# Patient Record
Sex: FEMALE | Race: WHITE | Employment: FULL TIME | URBAN - METROPOLITAN AREA
[De-identification: names, ages, dates, MRNs, and addresses within clinical notes are randomized per-mention and may not be internally consistent; named-entity substitution may affect disease eponyms.]

---

## 2020-07-20 NOTE — PROGRESS NOTES
Attempted all 3 of PT's listed numbers to schedule COVID testing without success. PT's home number is not to be used as she no longer lives there. Will attempt to call her work and cellphone numbers later.  MB

## 2020-07-21 NOTE — PROGRESS NOTES
Radiation Oncology COVID-19 Patient Pre Screen  HAVE YOU HAD ANY OF THESE SYMPTOMS IN THE LAST 14 DAYS? Cough   Fever   Shortness of Breath  NO  HAVE YOU TRAVELED OUTSIDE OF [place] IN THE LAST 14 DAYS? NO    HAVE YOU BEEN EXPOSED TO ANYONE WITH COVID 19 IN THE LAST 14 DAYS? NO      Patient notified of scheduling and testing process for COVID 19 testing. Patient verbalized understanding that they are to get their testing done 72 hours prior to their [place] [address] appointment. PT was unable to get tested until 7/22 per her request.  PT aware that unavailability of test results can cause treatment delays.   MB

## 2020-07-22 ENCOUNTER — HOSPITAL ENCOUNTER (OUTPATIENT)
Age: 60
Setting detail: SPECIMEN
Discharge: HOME | End: 2020-07-22
Payer: COMMERCIAL

## 2020-07-22 ENCOUNTER — NURSE ONLY (OUTPATIENT)
Dept: PRIMARY CARE CLINIC | Age: 60
End: 2020-07-22

## 2020-07-22 PROCEDURE — U0003 INFECTIOUS AGENT DETECTION BY NUCLEIC ACID (DNA OR RNA); SEVERE ACUTE RESPIRATORY SYNDROME CORONAVIRUS 2 (SARS-COV-2) (CORONAVIRUS DISEASE [COVID-19]), AMPLIFIED PROBE TECHNIQUE, MAKING USE OF HIGH THROUGHPUT TECHNOLOGIES AS DESCRIBED BY CMS-2020-01-R: HCPCS

## 2020-07-24 ENCOUNTER — HOSPITAL ENCOUNTER (OUTPATIENT)
Dept: RADIATION ONCOLOGY | Age: 60
Discharge: HOME | End: 2020-07-24
Attending: RADIOLOGY
Payer: COMMERCIAL

## 2020-07-24 ENCOUNTER — HOSPITAL ENCOUNTER (OUTPATIENT)
Dept: RADIATION ONCOLOGY | Age: 60
Discharge: HOME | End: 2020-07-24
Payer: COMMERCIAL

## 2020-07-24 VITALS
SYSTOLIC BLOOD PRESSURE: 178 MMHG | OXYGEN SATURATION: 98 % | HEIGHT: 62 IN | BODY MASS INDEX: 20.06 KG/M2 | HEART RATE: 81 BPM | RESPIRATION RATE: 16 BRPM | WEIGHT: 109 LBS | TEMPERATURE: 97.9 F | DIASTOLIC BLOOD PRESSURE: 96 MMHG

## 2020-07-24 LAB
SARS-COV-2: NOT DETECTED
SOURCE: NORMAL

## 2020-07-24 PROCEDURE — 77334 RADIATION TREATMENT AID(S): CPT | Performed by: RADIOLOGY

## 2020-07-24 PROCEDURE — 77290 THER RAD SIMULAJ FIELD CPLX: CPT | Performed by: RADIOLOGY

## 2020-07-24 PROCEDURE — 99212 OFFICE O/P EST SF 10 MIN: CPT | Performed by: RADIOLOGY

## 2020-07-24 NOTE — H&P
breast at 5 o'clock     middle depth is suspicious of malignancy.  A surgical consult is     recommended.  If breast conservation is desired, then MRI biopsy is     recommended.    The 1 cm x 1 cm x 1.2 cm irregular mass in the left breast at 1 o'clock     middle depth is a known biopsy positive for malignancy.     She declined additional breast biopsy and communicated that she was amenable to mastectomy,. Dr Bonnie Nieto initiated [address] 1/20 which was completed 5/20. She underwent mastectomy on 6/3/20 without reconstruction.  Final pathology showed:      LEFT BREAST:        BREAST INVASIVE CARCINOMA WORKSHEET         Part: A-E   Procedure:         Total mastectomy (including nipple-sparing and skin-sparing   mastectomy)   Specimen Laterality:         Left   Tumor size: Size of largest invasive carcinoma:         Microinvasion only (<=1 mm)   Tumor Focality:         Single focus of invasive carcinoma   Histologic Type of Invasive Carcinoma:         Invasive carcinoma of no special type (ductal, not otherwise   specified)   Histologic Grade:         Glandular (Acinar) / Tubular Differentiation:         Only microinvasion present (not graded)         Nuclear Pleomorphism:         Only microinvasion present (not graded)         Mitotic Rate:         Only microinvasion present (not graded)         Overall Grade:         Only microinvasion present (not graded)   Ductal Carcinoma In Situ:         Not identified   Tumor Extension:   Skin:         Skin is present and uninvolved   Nipple:         Uninvolved   Skeletal muscle:         No skeletal muscle is present   Invasive Carcinoma Margins:         Margins uninvolved by invasive carcinoma         Distance from closest margin: 10 mm         Closest margin: Deep   DCIS Margins:         Not applicable (no DCIS in specimen)   Lymph Nodes:         Involved by tumor cells         Number of lymph nodes with macrometastases (>2 mm): 0         Number of lymph nodes with micrometastases (>0.2 mm to 2 mm and/or   >200 cells): 0         Number of lymph nodes with isolated tumor cells (<= 0.2 mm and <= 200   cells): 1         Size of largest metastatic deposit: 0.1 mm         Extranodal extension not identified         Total number of lymph nodes examined: 4         Number of sentinel nodes examined (if applicable): 4   Treatment Effect:         Probable or definite response to presurgical therapy in the invasive   carcinoma         Probable or definite response to presurgical therapy in metastatic   carcinoma   Lymph-Vascular Invasion:         Not identified   Pathologic Stage Classification (pTNM,AJCC 8th ed)   TNM Descriptor(s):         y (post- treatment)   Primary Tumor (Invasive Carcinoma) (pT):         pT1mi   Regional Lymph Nodes (pN):         Modifier:         (sn): [place] node(s) evaluated   Category (pN):         pN0 (i+)   Distant metastasis:         Distant Metastasis (pM) Not applicable/Not confirmed pathologically   in this case   Estrogen & progesterone receptors:         Previously performed and reported as follows:           Estrogen receptor: Negative (<1%)           Progesterone receptor: Negative (<1%)           Specimen number #: [id number]   (HER2) ERBB2 Status:         Previously performed and reported as follows:           HER2:Negative (1+)           Specimen number #: [id number]   Representative Tumor Block:         Specify: E2   Residual tumor burden:         Tumor bed dimension #1: 7 mm         Tumor bed dimension #2: 7 mm         Overall tumor cellularity 1%         Percentage in situ 0%       She was seen by radiation oncology at [facility] but would like to be treated closer to home in [facility]. She is here today for adjuvant radiation recommendations.     PAST MEDICAL HISTORY:   Past Medical History:   Diagnosis Date    Cancer [facility])     Environmental allergies        PAST SURGICAL HISTORY:  Past Surgical History:   Procedure Laterality Date    BREAST LUMPECTOMY Left     20 years ago    COLONOSCOPY  14    DR PIZANO    TUBAL LIGATION          ALLERGIES:   No Known Allergies    CURRENT MEDICATIONS:   Prior to Admission medications    Not on File       I have personally reviewed and reconciled the medications listed. FAMILY HISTORY:   Family History   Problem Relation Age of Onset    Colon Cancer Father 46    Liver Cancer Father     Colon Cancer Maternal Grandmother        SOCIAL HISTORY:   Social History     Tobacco Use   Smoking Status Current Every Day Smoker     Social History     Substance and Sexual Activity   Alcohol Use None       Review Of Systems:     Pain Score: denies       General: weight loss, appetite loss  Patient has gained weight []? Yes   [x]? No  Patient has lost weight [x]? Yes   []? No  How much weight in pounds and over what length of time: about 20 lbs over last year     Eyes (Ophthalmic): denies                Skin (Dermatological): healing masectomy incision left side                ENT: denies                Respiratory: denies                Cardiovascular: denies                            Device   []? Yes   [x]? No              Copy of Card Obtained []? Yes   [x]? No     Gastrointestinal: denies     Genito-Urinary: denies                Breast: mastectomy left side 6/3/2020                Musculoskeletal: denies     Neurological: denies                             Hematological and Lymphatic: chemotherapy adriamycin, paclitaxel. Completed 2020.  Medical oncologist Dr Tita Sheppard                 Endocrine: denies     Gyn History:   Mami Schoolin/  Age of Menarche: 17  Age of Menopause endometrial ablation 47years old, did not notice any symptoms of menopause after this procedure  Vaginal Bleeding: none  First Pregnancy: 17  Breast Feeding:  no  Last Menstrual period: N/A  Oral Contraceptives: yes     Number of years: 15  Hormone Replacement therapy none     Number of Years: N/A  Last Pap Smear:   Last Mammogram 10/2019       A 10-point review of systems has been conducted and pertinent positives have been   recorded. All other review of systems are negative. ECOG PERFORMANCE STATUS: 0    VITAL SIGNS:   Vitals:    07/24/20 1021   BP: (!) 178/96   Pulse: 81   Resp: 16   Temp: 97.9 °F (36.6 °C)   TempSrc: Temporal   SpO2: 98%   Weight: 109 lb (49.4 kg)   Height: 5' 2\" (1.575 m)       PHYSICAL EXAMINATION:  Constitutional: No acute distress. awake, alert, cooperative    HEENT:  Normocephalic, without obvious abnormality, atraumatic, EOMI, external ears without lesions, oral pharynx with moist mucus membranes, orophayrnx clear, mucous membranes moist    NECK:  Supple, without supraclavicular or cervical adenopathy, thyroid symmetric, not enlarged    CARDIOVASCULAR:  Normal apical impulse, regular rate and rhythm, normal S1 and S2, no S3 or S4, and no murmur noted    CHEST/BREASTS:  Breasts asymmetrical s/p L mastectomy with well healed incisions, skin without lesion(s), no nipple retraction or dimpling, no nipple discharge, no masses palpated, no axillary or supraclavicular adenopathy    LUNGS:  No increased work of breathing, good air exchange, clear to auscultation bilaterally, no crackles or wheezing    ABDOMEN:  Nontender, nondistended, normal bowel sounds, soft, no masses, no hepatosplenomegally    EXTREMITIES: No cyanosis clubbing or lymphedema    MUSCULOSKELETAL: No bony tenderness along the hips ribs or spine. Motor strength is 5 out of 5 all extremities bilaterally. Tone is normal.    NEUROLOGIC:  Awake, alert, oriented x 3. Nonfocal    SKIN:  Tanned, no bruising or bleeding, normal skin color, texture, turgor, no redness, warmth, or swelling, no rashes, no lesions, no abnormal moles and no jaundice      STUDIES: I have personally reviewed the imaging, laboratory, and pathology studies as previously described.     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 Adjuvant radiation to the L CW and regional nodes 50GY 25 fx with consideration of boost to tumor bed for high grade. Active breath control evaluation for cardiac sparing. Skin care and avoidance of tanning as well as potential side effects discussed. Also discussed [facility] CH[facility] trial for hypofractionated vs standard radiotherapy following mastectomy, however she is not a candidate as she does not intend on reconstruction which is a ceriteria for enrollment. Smoking cessation    Intent of treatment, potential risks benefits and side effects reviewed today. Thank you for this consult and allowing us to participate in the care of this patient. Sincerely,    Electronically signed by Regis Cortez MD on 7/24/20 at 11:49 AM EDT      cc:   No att. providers found  Jose Franco MD  No referring provider defined for this encounter.

## 2020-07-24 NOTE — FLOWSHEET NOTE
NURSING ASSESSMENT     Date: 7/24/2020        Patient Name: Marlin Harrison     YOB: 1960      Age:  64 y.o. MRN: [id number]     Chaperone [] Yes   [] No      Advance Directives:   Do you currently have completed advance directives (living will)? [x] Yes   [] No         if yes, please bring us a copy for your records. Pain Score: denies      General: weight loss, appetite loss  Patient has gained weight [] Yes   [x] No  Patient has lost weight [x] Yes   [] No  How much weight in pounds and over what length of time: about 20 lbs over last year    Eyes (Ophthalmic): denies     Skin (Dermatological): healing masectomy incision left side     ENT: denies     Respiratory: denies     Cardiovascular: denies      Device   [] Yes   [x] No   Copy of Card Obtained [] Yes   [x] No    Gastrointestinal: denies    Genito-Urinary: denies     Breast: mastectomy left side 6/3/2020     Musculoskeletal: denies    Neurological: denies       Hematological and Lymphatic: chemotherapy adriamycin, paclitaxel. Completed 5/1/2020.  Medical oncologist Dr Emperatriz Carlson      Endocrine: denies    Gyn History:   Fausto Asher: 2/2  Age of Menarche: 17  Age of Menopause endometrial ablation 47years old, did not notice any symptoms of menopause after this procedure  Vaginal Bleeding: none  First Pregnancy: 17  Breast Feeding:  no  Last Menstrual period: N/A  Oral Contraceptives: yes     Number of years: 15  Hormone Replacement therapy none     Number of Years: N/A  Last Pap Smear: 2015  Last Mammogram 10/2019

## 2020-07-29 PROCEDURE — 77295 3-D RADIOTHERAPY PLAN: CPT | Performed by: RADIOLOGY

## 2020-07-29 PROCEDURE — 77334 RADIATION TREATMENT AID(S): CPT | Performed by: RADIOLOGY

## 2020-07-29 PROCEDURE — 77300 RADIATION THERAPY DOSE PLAN: CPT | Performed by: RADIOLOGY

## 2020-07-30 PROCEDURE — 77470 SPECIAL RADIATION TREATMENT: CPT | Performed by: RADIOLOGY

## 2020-07-31 ENCOUNTER — HOSPITAL ENCOUNTER (OUTPATIENT)
Dept: RADIATION ONCOLOGY | Age: 60
Discharge: HOME | End: 2020-07-31
Attending: RADIOLOGY
Payer: COMMERCIAL

## 2020-07-31 PROCEDURE — 77280 THER RAD SIMULAJ FIELD SMPL: CPT | Performed by: RADIOLOGY

## 2020-07-31 PROCEDURE — 99214 OFFICE O/P EST MOD 30 MIN: CPT | Performed by: RADIOLOGY

## 2020-08-03 ENCOUNTER — HOSPITAL ENCOUNTER (OUTPATIENT)
Dept: RADIATION ONCOLOGY | Age: 60
Discharge: HOME | End: 2020-08-03
Attending: RADIOLOGY
Payer: COMMERCIAL

## 2020-08-03 PROCEDURE — 77412 RADIATION TX DELIVERY LVL 3: CPT | Performed by: RADIOLOGY

## 2020-08-03 PROCEDURE — 77387 GUIDANCE FOR RADJ TX DLVR: CPT | Performed by: RADIOLOGY

## 2020-08-04 ENCOUNTER — HOSPITAL ENCOUNTER (OUTPATIENT)
Dept: RADIATION ONCOLOGY | Age: 60
Discharge: HOME | End: 2020-08-04
Attending: RADIOLOGY
Payer: COMMERCIAL

## 2020-08-04 PROCEDURE — 99211 OFF/OP EST MAY X REQ PHY/QHP: CPT | Performed by: RADIOLOGY

## 2020-08-04 PROCEDURE — 77387 GUIDANCE FOR RADJ TX DLVR: CPT | Performed by: RADIOLOGY

## 2020-08-04 PROCEDURE — 77412 RADIATION TX DELIVERY LVL 3: CPT | Performed by: RADIOLOGY

## 2020-08-05 ENCOUNTER — APPOINTMENT (OUTPATIENT)
Dept: RADIATION ONCOLOGY | Age: 60
End: 2020-08-05
Attending: RADIOLOGY
Payer: COMMERCIAL

## 2020-08-05 PROCEDURE — 77412 RADIATION TX DELIVERY LVL 3: CPT | Performed by: RADIOLOGY

## 2020-08-05 PROCEDURE — 77387 GUIDANCE FOR RADJ TX DLVR: CPT | Performed by: RADIOLOGY

## 2020-08-06 ENCOUNTER — APPOINTMENT (OUTPATIENT)
Dept: RADIATION ONCOLOGY | Age: 60
End: 2020-08-06
Attending: RADIOLOGY
Payer: COMMERCIAL

## 2020-08-06 PROCEDURE — 77412 RADIATION TX DELIVERY LVL 3: CPT | Performed by: RADIOLOGY

## 2020-08-06 PROCEDURE — 77387 GUIDANCE FOR RADJ TX DLVR: CPT | Performed by: RADIOLOGY

## 2020-08-07 ENCOUNTER — APPOINTMENT (OUTPATIENT)
Dept: RADIATION ONCOLOGY | Age: 60
End: 2020-08-07
Attending: RADIOLOGY
Payer: COMMERCIAL

## 2020-08-07 PROCEDURE — 77387 GUIDANCE FOR RADJ TX DLVR: CPT | Performed by: RADIOLOGY

## 2020-08-07 PROCEDURE — 77417 THER RADIOLOGY PORT IMAGE(S): CPT | Performed by: RADIOLOGY

## 2020-08-07 PROCEDURE — 77412 RADIATION TX DELIVERY LVL 3: CPT | Performed by: RADIOLOGY

## 2020-08-07 PROCEDURE — 77336 RADIATION PHYSICS CONSULT: CPT | Performed by: RADIOLOGY

## 2020-08-10 ENCOUNTER — APPOINTMENT (OUTPATIENT)
Dept: RADIATION ONCOLOGY | Age: 60
End: 2020-08-10
Attending: RADIOLOGY
Payer: COMMERCIAL

## 2020-08-10 PROCEDURE — 77412 RADIATION TX DELIVERY LVL 3: CPT | Performed by: RADIOLOGY

## 2020-08-10 PROCEDURE — 77387 GUIDANCE FOR RADJ TX DLVR: CPT | Performed by: RADIOLOGY

## 2020-08-11 ENCOUNTER — HOSPITAL ENCOUNTER (OUTPATIENT)
Dept: RADIATION ONCOLOGY | Age: 60
Discharge: HOME | End: 2020-08-11
Attending: RADIOLOGY
Payer: COMMERCIAL

## 2020-08-11 PROCEDURE — 77412 RADIATION TX DELIVERY LVL 3: CPT | Performed by: RADIOLOGY

## 2020-08-11 PROCEDURE — 77387 GUIDANCE FOR RADJ TX DLVR: CPT | Performed by: RADIOLOGY

## 2020-08-11 PROCEDURE — 99211 OFF/OP EST MAY X REQ PHY/QHP: CPT | Performed by: RADIOLOGY

## 2020-08-12 ENCOUNTER — APPOINTMENT (OUTPATIENT)
Dept: RADIATION ONCOLOGY | Age: 60
End: 2020-08-12
Attending: RADIOLOGY
Payer: COMMERCIAL

## 2020-08-12 PROCEDURE — 77387 GUIDANCE FOR RADJ TX DLVR: CPT | Performed by: RADIOLOGY

## 2020-08-12 PROCEDURE — 77412 RADIATION TX DELIVERY LVL 3: CPT | Performed by: RADIOLOGY

## 2020-08-13 ENCOUNTER — APPOINTMENT (OUTPATIENT)
Dept: RADIATION ONCOLOGY | Age: 60
End: 2020-08-13
Attending: RADIOLOGY
Payer: COMMERCIAL

## 2020-08-13 PROCEDURE — 77412 RADIATION TX DELIVERY LVL 3: CPT | Performed by: RADIOLOGY

## 2020-08-13 PROCEDURE — 77387 GUIDANCE FOR RADJ TX DLVR: CPT | Performed by: RADIOLOGY

## 2020-08-14 ENCOUNTER — APPOINTMENT (OUTPATIENT)
Dept: RADIATION ONCOLOGY | Age: 60
End: 2020-08-14
Attending: RADIOLOGY
Payer: COMMERCIAL

## 2020-08-14 PROCEDURE — 77412 RADIATION TX DELIVERY LVL 3: CPT | Performed by: RADIOLOGY

## 2020-08-14 PROCEDURE — 77387 GUIDANCE FOR RADJ TX DLVR: CPT | Performed by: RADIOLOGY

## 2020-08-14 PROCEDURE — 77336 RADIATION PHYSICS CONSULT: CPT | Performed by: RADIOLOGY

## 2020-08-14 PROCEDURE — 77417 THER RADIOLOGY PORT IMAGE(S): CPT | Performed by: RADIOLOGY

## 2020-08-17 ENCOUNTER — HOSPITAL ENCOUNTER (OUTPATIENT)
Dept: RADIATION ONCOLOGY | Age: 60
Discharge: HOME | End: 2020-08-17
Attending: RADIOLOGY
Payer: COMMERCIAL

## 2020-08-17 PROCEDURE — 77387 GUIDANCE FOR RADJ TX DLVR: CPT | Performed by: RADIOLOGY

## 2020-08-17 PROCEDURE — 77412 RADIATION TX DELIVERY LVL 3: CPT | Performed by: RADIOLOGY

## 2020-08-18 ENCOUNTER — HOSPITAL ENCOUNTER (OUTPATIENT)
Dept: RADIATION ONCOLOGY | Age: 60
Discharge: HOME | End: 2020-08-18
Attending: RADIOLOGY
Payer: COMMERCIAL

## 2020-08-18 PROCEDURE — 77412 RADIATION TX DELIVERY LVL 3: CPT | Performed by: RADIOLOGY

## 2020-08-18 PROCEDURE — 77387 GUIDANCE FOR RADJ TX DLVR: CPT | Performed by: RADIOLOGY

## 2020-08-18 PROCEDURE — 99212 OFFICE O/P EST SF 10 MIN: CPT | Performed by: RADIOLOGY

## 2020-08-19 ENCOUNTER — HOSPITAL ENCOUNTER (OUTPATIENT)
Dept: RADIATION ONCOLOGY | Age: 60
End: 2020-08-19
Attending: RADIOLOGY
Payer: COMMERCIAL

## 2020-08-19 PROCEDURE — 77412 RADIATION TX DELIVERY LVL 3: CPT | Performed by: RADIOLOGY

## 2020-08-19 PROCEDURE — 77387 GUIDANCE FOR RADJ TX DLVR: CPT | Performed by: RADIOLOGY

## 2020-08-20 ENCOUNTER — HOSPITAL ENCOUNTER (OUTPATIENT)
Dept: RADIATION ONCOLOGY | Age: 60
Discharge: HOME | End: 2020-08-20
Attending: RADIOLOGY
Payer: COMMERCIAL

## 2020-08-20 PROCEDURE — 77412 RADIATION TX DELIVERY LVL 3: CPT | Performed by: RADIOLOGY

## 2020-08-20 PROCEDURE — 77387 GUIDANCE FOR RADJ TX DLVR: CPT | Performed by: RADIOLOGY

## 2020-08-20 NOTE — PROGRESS NOTES
Eduardo Oropeza   [id number]  1960   Patient Mid-Point Distress Screening Form   Please select the number that describes how much distress you have experiened in the past week (0-10): 0    Please select the number that descrbes how much distress you have experienced today (0-10): 0    If you responded with a score of 6 or above to the first tow questions; please address the following concerns:    Practical Concerns 0-10 Comment        Work, Concerns about Job          Finances, [address], Insurance          Housing          Transportation          Availability of Caregiver     Family Concerns          Dealing with Children          Dealing with Partner          Ability to have [address] Issues     Emotional Concerns          Sadness, Depression          Anxiety, Worry, Fear          Concerns about Appearance          Loss of Interest in Usual Activities     Spiritual Concerns          Connection to a higher power          Sense of meaning and purpose          Support for my spiritual community     Physical Concerns          Nausea          Eating, Loss of Appetite          Pain          Fatigue       Other Concerns/Notes:  Pt reports she feels she is doing well with treatment. She is pleased to be prison through, and she expressed appreciation for the care she is receiving, \"I haven't met anyone who has not been great. \"  She was pleasant and engaging throughout. She denied any concerns but was agreeable to contacting  should any needs arise in future. Date of visit: No admission date for patient encounter. : Stephanie Vargas.  LISA Tsai

## 2020-08-21 ENCOUNTER — APPOINTMENT (OUTPATIENT)
Dept: RADIATION ONCOLOGY | Age: 60
End: 2020-08-21
Attending: RADIOLOGY
Payer: COMMERCIAL

## 2020-08-21 PROCEDURE — 77412 RADIATION TX DELIVERY LVL 3: CPT | Performed by: RADIOLOGY

## 2020-08-21 PROCEDURE — 77336 RADIATION PHYSICS CONSULT: CPT | Performed by: RADIOLOGY

## 2020-08-21 PROCEDURE — 77417 THER RADIOLOGY PORT IMAGE(S): CPT | Performed by: RADIOLOGY

## 2020-08-21 PROCEDURE — 77387 GUIDANCE FOR RADJ TX DLVR: CPT | Performed by: RADIOLOGY

## 2020-08-24 ENCOUNTER — HOSPITAL ENCOUNTER (OUTPATIENT)
Dept: RADIATION ONCOLOGY | Age: 60
Discharge: HOME | End: 2020-08-24
Attending: RADIOLOGY
Payer: COMMERCIAL

## 2020-08-24 PROCEDURE — 77387 GUIDANCE FOR RADJ TX DLVR: CPT | Performed by: RADIOLOGY

## 2020-08-24 PROCEDURE — 77412 RADIATION TX DELIVERY LVL 3: CPT | Performed by: RADIOLOGY

## 2020-08-25 ENCOUNTER — HOSPITAL ENCOUNTER (OUTPATIENT)
Dept: RADIATION ONCOLOGY | Age: 60
Discharge: HOME | End: 2020-08-25
Attending: RADIOLOGY
Payer: COMMERCIAL

## 2020-08-25 PROCEDURE — 77387 GUIDANCE FOR RADJ TX DLVR: CPT | Performed by: RADIOLOGY

## 2020-08-25 PROCEDURE — 77412 RADIATION TX DELIVERY LVL 3: CPT | Performed by: RADIOLOGY

## 2020-08-25 PROCEDURE — 99211 OFF/OP EST MAY X REQ PHY/QHP: CPT | Performed by: RADIOLOGY

## 2020-08-26 ENCOUNTER — APPOINTMENT (OUTPATIENT)
Dept: RADIATION ONCOLOGY | Age: 60
End: 2020-08-26
Attending: RADIOLOGY
Payer: COMMERCIAL

## 2020-08-26 PROCEDURE — 77387 GUIDANCE FOR RADJ TX DLVR: CPT | Performed by: RADIOLOGY

## 2020-08-26 PROCEDURE — 77412 RADIATION TX DELIVERY LVL 3: CPT | Performed by: RADIOLOGY

## 2020-08-27 ENCOUNTER — APPOINTMENT (OUTPATIENT)
Dept: RADIATION ONCOLOGY | Age: 60
End: 2020-08-27
Attending: RADIOLOGY
Payer: COMMERCIAL

## 2020-08-27 PROCEDURE — 77412 RADIATION TX DELIVERY LVL 3: CPT | Performed by: RADIOLOGY

## 2020-08-27 PROCEDURE — 77387 GUIDANCE FOR RADJ TX DLVR: CPT | Performed by: RADIOLOGY

## 2020-08-28 ENCOUNTER — APPOINTMENT (OUTPATIENT)
Dept: RADIATION ONCOLOGY | Age: 60
End: 2020-08-28
Attending: RADIOLOGY
Payer: COMMERCIAL

## 2020-08-28 PROCEDURE — 77387 GUIDANCE FOR RADJ TX DLVR: CPT | Performed by: RADIOLOGY

## 2020-08-28 PROCEDURE — 77417 THER RADIOLOGY PORT IMAGE(S): CPT | Performed by: RADIOLOGY

## 2020-08-28 PROCEDURE — 77336 RADIATION PHYSICS CONSULT: CPT | Performed by: RADIOLOGY

## 2020-08-28 PROCEDURE — 77412 RADIATION TX DELIVERY LVL 3: CPT | Performed by: RADIOLOGY

## 2020-08-31 ENCOUNTER — HOSPITAL ENCOUNTER (OUTPATIENT)
Dept: RADIATION ONCOLOGY | Age: 60
Discharge: HOME | End: 2020-08-31
Attending: RADIOLOGY
Payer: COMMERCIAL

## 2020-08-31 PROCEDURE — 77387 GUIDANCE FOR RADJ TX DLVR: CPT | Performed by: RADIOLOGY

## 2020-08-31 PROCEDURE — 77412 RADIATION TX DELIVERY LVL 3: CPT | Performed by: RADIOLOGY

## 2020-08-31 NOTE — ONCOLOGY
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 Carlos Gomez tolerated radiation treatment well with the expected toxicity. Patient was instructed to return to our clinic in approximately 1 weeks post radiation treatment for skin check. She will also see Dr Tita Sheppard for continued surveillance and Dr Zoila Ramirez from [facility] surgery. Mammography at [facility].

## 2020-09-01 ENCOUNTER — HOSPITAL ENCOUNTER (EMERGENCY)
Age: 60
Discharge: HOME | End: 2020-09-01
Attending: EMERGENCY MEDICINE
Payer: COMMERCIAL

## 2020-09-01 ENCOUNTER — APPOINTMENT (OUTPATIENT)
Dept: GENERAL RADIOLOGY | Age: 60
End: 2020-09-01
Payer: COMMERCIAL

## 2020-09-01 ENCOUNTER — HOSPITAL ENCOUNTER (OUTPATIENT)
Dept: RADIATION ONCOLOGY | Age: 60
Discharge: HOME | End: 2020-09-01
Attending: RADIOLOGY
Payer: COMMERCIAL

## 2020-09-01 VITALS
RESPIRATION RATE: 18 BRPM | HEART RATE: 78 BPM | WEIGHT: 109 LBS | DIASTOLIC BLOOD PRESSURE: 78 MMHG | SYSTOLIC BLOOD PRESSURE: 144 MMHG | OXYGEN SATURATION: 97 % | TEMPERATURE: 98.3 F | HEIGHT: 62 IN | BODY MASS INDEX: 20.06 KG/M2

## 2020-09-01 PROCEDURE — 77387 GUIDANCE FOR RADJ TX DLVR: CPT | Performed by: RADIOLOGY

## 2020-09-01 PROCEDURE — 77412 RADIATION TX DELIVERY LVL 3: CPT | Performed by: RADIOLOGY

## 2020-09-01 PROCEDURE — 99283 EMERGENCY DEPT VISIT LOW MDM: CPT

## 2020-09-01 PROCEDURE — 99213 OFFICE O/P EST LOW 20 MIN: CPT | Performed by: RADIOLOGY

## 2020-09-01 PROCEDURE — 73610 X-RAY EXAM OF ANKLE: CPT

## 2020-09-01 RX ORDER — NAPROXEN 500 MG/1
500 TABLET ORAL 2 TIMES DAILY
Qty: 14 TABLET | Refills: 0 | Status: SHIPPED | OUTPATIENT
Start: 2020-09-01 | End: 2020-09-09 | Stop reason: ALTCHOICE

## 2020-09-01 ASSESSMENT — PAIN SCALES - GENERAL: PAINLEVEL_OUTOF10: 3

## 2020-09-01 ASSESSMENT — PAIN DESCRIPTION - ONSET: ONSET: SUDDEN

## 2020-09-01 ASSESSMENT — PAIN DESCRIPTION - DESCRIPTORS: DESCRIPTORS: THROBBING

## 2020-09-01 ASSESSMENT — PAIN DESCRIPTION - ORIENTATION: ORIENTATION: RIGHT

## 2020-09-01 ASSESSMENT — PAIN DESCRIPTION - LOCATION: LOCATION: ANKLE

## 2020-09-01 ASSESSMENT — PAIN DESCRIPTION - PAIN TYPE: TYPE: ACUTE PAIN

## 2020-09-01 ASSESSMENT — PAIN DESCRIPTION - FREQUENCY: FREQUENCY: CONTINUOUS

## 2020-09-01 NOTE — ED TRIAGE NOTES
Patient to room #8 for c/o right ankle pain with bruising after falling on rocks last HS. Patient denies any other injuries from fall.

## 2020-09-01 NOTE — ED NOTES
Ankle brace applied to right ankle. MSP's intact before and after.      Tati Patterson RN  09/01/20 [id number]

## 2020-09-01 NOTE — ED PROVIDER NOTES
HPI:  9/1/20, Time: 8:51 AM EDT         Tomy Sahni is a 64 y.o. female presenting to the ED for right ankle pain, beginning 1 day ago. The complaint has been intermittent, mild in severity, and worsened by standing, walking. Patient twisted her right ankle while watering her plants yesterday evening she describes some pain and swelling laterally. She is able to bear weight but this causes discomfort. There is no pain in the knee nor foot. No pain in the Achilles tendon    ROS:   Pertinent positives and negatives are stated within HPI, all other systems reviewed and are negative.  --------------------------------------------- PAST HISTORY ---------------------------------------------  Past Medical History:  has a [facility] and Environmental allergies. Past Surgical History:  has a past surgical history that includes Tubal ligation; Colonoscopy (1/31/14); and Breast lumpectomy (Left). Social History:  reports that she has been smoking cigarettes. She has been smoking about 1.00 pack per day. She has never used smokeless tobacco. She reports that she does not use drugs. Family History: family history includes Colon Cancer in her maternal grandmother; Colon Cancer (age of onset: 46) in her father; Liver Cancer in her father. The patients home medications have been reviewed. Allergies: Patient has no known allergies. ---------------------------------------------------PHYSICAL EXAM--------------------------------------     Constitutional/General: Alert and oriented x3, well appearing, non toxic in NAD  Head: Normocephalic and atraumatic  Eyes: PERRL, EOMI  Mouth: Oropharynx clear, handling secretions, no trismus  Neck: Supple, full ROM, non tender to palpation in the midline, no stridor, no crepitus, no meningeal signs  Pulmonary: Lungs clear to auscultation bilaterally, no wheezes, rales, or rhonchi. Not in respiratory distress  Cardiovascular:  Regular rate. Neurovascular exam is intact after placement she was discharged home on Naprosyn with outpatient follow-up in 2 to 3 days    Re-Evaluations:             Re-evaluation. Patients symptoms are improving      Consultations: This patient's ED course included: re-evaluation prior to disposition and a personal history and physicial eaxmination    This patient has remained hemodynamically stable and improved during their ED course. Counseling: The emergency provider has spoken with the patient and discussed todays results, in addition to providing specific details for the plan of care and counseling regarding the diagnosis and prognosis. Questions are answered at this time and they are agreeable with the plan.       --------------------------------- IMPRESSION AND DISPOSITION ---------------------------------    IMPRESSION  1. Sprain of right ankle, unspecified ligament, initial encounter        DISPOSITION  Disposition: Discharge to home  Patient condition is good        NOTE: This report was transcribed using voice recognition software.  Every effort was made to ensure accuracy; however, inadvertent computerized transcription errors may be present          Margy Goodson MD  09/01/20 [id number]

## 2020-09-02 ENCOUNTER — HOSPITAL ENCOUNTER (OUTPATIENT)
Dept: RADIATION ONCOLOGY | Age: 60
Discharge: HOME | End: 2020-09-02
Attending: RADIOLOGY
Payer: COMMERCIAL

## 2020-09-02 PROCEDURE — 77412 RADIATION TX DELIVERY LVL 3: CPT | Performed by: RADIOLOGY

## 2020-09-02 PROCEDURE — 77387 GUIDANCE FOR RADJ TX DLVR: CPT | Performed by: RADIOLOGY

## 2020-09-03 ENCOUNTER — HOSPITAL ENCOUNTER (OUTPATIENT)
Dept: RADIATION ONCOLOGY | Age: 60
Discharge: HOME | End: 2020-09-03
Attending: RADIOLOGY
Payer: COMMERCIAL

## 2020-09-03 PROCEDURE — 77412 RADIATION TX DELIVERY LVL 3: CPT | Performed by: RADIOLOGY

## 2020-09-03 PROCEDURE — 77387 GUIDANCE FOR RADJ TX DLVR: CPT | Performed by: RADIOLOGY

## 2020-09-04 ENCOUNTER — HOSPITAL ENCOUNTER (OUTPATIENT)
Dept: RADIATION ONCOLOGY | Age: 60
Discharge: HOME | End: 2020-09-04
Attending: RADIOLOGY
Payer: COMMERCIAL

## 2020-09-04 PROCEDURE — 77412 RADIATION TX DELIVERY LVL 3: CPT | Performed by: RADIOLOGY

## 2020-09-04 PROCEDURE — 77387 GUIDANCE FOR RADJ TX DLVR: CPT | Performed by: RADIOLOGY

## 2020-09-04 PROCEDURE — 77336 RADIATION PHYSICS CONSULT: CPT | Performed by: RADIOLOGY

## 2020-09-09 ENCOUNTER — HOSPITAL ENCOUNTER (OUTPATIENT)
Dept: RADIATION ONCOLOGY | Age: 60
Discharge: HOME | End: 2020-09-09
Attending: RADIOLOGY
Payer: COMMERCIAL

## 2020-09-09 VITALS
OXYGEN SATURATION: 98 % | DIASTOLIC BLOOD PRESSURE: 92 MMHG | RESPIRATION RATE: 16 BRPM | HEART RATE: 109 BPM | SYSTOLIC BLOOD PRESSURE: 110 MMHG | TEMPERATURE: 98 F

## 2020-09-09 PROCEDURE — 99213 OFFICE O/P EST LOW 20 MIN: CPT | Performed by: RADIOLOGY

## 2020-09-09 NOTE — PROGRESS NOTES
NURSING ASSESSMENT     Date: 9/9/2020        Patient Name: Morgan Campos     YOB: 1960      Age:  64 y.o. MRN: [id number]     Chaperone [] Yes   [x] No        Pain Score:   Pain Score (1-10): none  Pain Location:   Pain Duration:    Pain Management/Control:       Is pain affecting your ability to take care of yourself or move throughout your home? [] Yes   [x] No    General: Fatigue  Patient has gained weight [] Yes   [x] No  Patient has lost weight [] Yes   [x] No  How much weight in pounds and over what length of time:     Eyes (Ophthalmic): No Problem     Skin (Dermatological): No Problems     ENT: No Problems     Respiratory: No Problems     Cardiovascular: No Problems      Device   [] Yes   [x] No   Copy of Card Obtained [] Yes   [x] No    Gastrointestinal: No Problems    Genito-Urinary: No Problems     Breast: Changes left breast is healing, continues to be slightly red and itchy     Musculoskeletal: No Problems    Neurological: No Problems      Hematological and Lymphatic: No Problems     Endocrine: No Problems     Gyn History:   No problems    A 10-point review of systems has been conducted and pertinent positives have been   recorded.  All other review of systems are negative    Was the patient admitted during the course of treatment OR within 30 days of treatment?   n/a  Additional Comments: Survivorship plan provided and reviewed

## 2020-09-09 NOTE — H&P
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 presented with L breast mass and palpable node [id number]  ddAC-T (1/20-5/20) s/p mastectomy  ypT1a (5mm) N0 (i+) 1/4 SLN + (0.1mm on IHC only no KENNY) no LVI or DCIS  ER <1% MS <1% Her2- (triple negative    PREVIOUS TREATMENT:  Treatment Dates:                    8/3-9/4/20     Site: Dose: Total # fractions: Dose per fraction: Energy: Technique   L breast ABC 50 Gy 25 2 Gy 10/18 MV photons ABC tangents   L SCF ABC 50 Gy 25 2 Gy 18 MV photons ABC HAHN LPO        CURRENT COMPLAINT: Here today for routine followup and skin check      INTERVAL HISTORY: Wilbert Quispe finished treatment last week . She is doing well but has dry itchy red patches in the [facility] and upper back. Wearing mastectomy bra. Here today for routine followup and skin check    PAST MEDICAL HISTORY:   Past Medical History:   Diagnosis Date    Cancer ([address]     left breast    Environmental allergies        PAST SURGICAL HISTORY:  Past Surgical History:   Procedure Laterality Date    BREAST LUMPECTOMY Left     20 years ago    COLONOSCOPY  1/31/14    DR PIZANO    TUBAL LIGATION         ALLERGIES:   No Known Allergies     I have personally reviewed and reconciled the allergies listed. CURRENT MEDICATIONS:   Prior to Admission medications    Not on File       I have personally reviewed and reconciled the medications listed.     FAMILY HISTORY:   Family History   Problem Relation Age of Onset    Colon Cancer Father 46    Liver Cancer Father     Colon Cancer Maternal Grandmother        SOCIAL HISTORY:   Social History     Tobacco Use   Smoking Status Current Every Day Smoker    Packs/day: 1.00    Types: Cigarettes   Smokeless Tobacco Never Used     Social History     Substance and Sexual Activity   Alcohol Use None    Comment: occasionally       Review Of Systems:     Pain Score:   Pain Score (1-10): none  Pain Location:   Pain Duration:    Pain Management/Control:       Is pain affecting your ability to take care of yourself or move throughout your home? []? Yes   [x]? No    General: Fatigue  Patient has gained weight []? Yes   [x]? No  Patient has lost weight []? Yes   [x]? No  How much weight in pounds and over what length of time:      Eyes (Ophthalmic): No Problem                Skin (Dermatological): No Problems                ENT: No Problems                Respiratory: No Problems                Cardiovascular: No Problems                            Device   []? Yes   [x]? No              Copy of Card Obtained []? Yes   [x]? No     Gastrointestinal: No Problems     Genito-Urinary: No Problems                Breast: Changes left breast is healing, continues to be slightly red and itchy                Musculoskeletal: No Problems     Neurological: No Problems                            Hematological and Lymphatic: No Problems                Endocrine: No Problems      Gyn History:   No problems       A 10-point review of systems has been conducted and pertinent positives have been   recorded. All other review of systems are negative. ECOG PERFORMANCE STATUS: 0    VITAL SIGNS:   Vitals:    09/09/20 1521   BP: (!) 110/92   Pulse: 109   Resp: 16   Temp: 98 °F (36.7 °C)   SpO2: 98%       PHYSICAL EXAMINATION:  Constitutional: No acute distress. awake, alert, cooperative    CHEST/BREASTS:  S./p left mastectomy healed incision. Mostly G1 erythema no breakdown rough red patch at lateral SCF and upper back. EXTREMITIES: NO cyanosis clubbing or lymphedema      IMPRESSION/PLAN:    Resolving dermatitis. Long term skin care, followup and survivorship discussed. Written material given. Smoking cessation. Starts in 2 weeks with 8 months of oral chemotherapy (xeloda). PORT out next week. Will continue to follow closely with medical oncology and is scheduled to see Dr Marsha Kirk.      Thank you for allowing us to participate in the care of this patient. Sincerely,    Electronically signed by Honorio Gutierrez MD on 9/9/20 at 3:37 PM EDT      cc:   MD Susan Del Angel MD  No referring provider defined for this encounter.